# Patient Record
Sex: MALE | Race: BLACK OR AFRICAN AMERICAN | Employment: UNEMPLOYED | URBAN - METROPOLITAN AREA
[De-identification: names, ages, dates, MRNs, and addresses within clinical notes are randomized per-mention and may not be internally consistent; named-entity substitution may affect disease eponyms.]

---
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
## 2021-07-24 NOTE — ED PROVIDER NOTES
EMERGENCY DEPARTMENT HISTORY AND PHYSICAL EXAM      Date: 12/23/2018  Patient Name: Taco Lara    History of Presenting Illness     Chief Complaint   Patient presents with    Abscess     Patient states that he has a \"boil\" under his left arm        History Provided By: Patient and Patient's Mother    HPI: Taco Lara, 22 y.o. male with PMHx significant for obesity, presents ambulatory to the ED with cc of boil underneath of his L arm since Thursday. Pt states that he has had a boil once before that required I&D. Pt states that his pain is aching and sharp when the area is touched. No medication PTA. He has been without fever, chills, drainage, skin color changes to the area. PCP: None    There are no other complaints, changes, or physical findings at this time. Past History     Past Medical History:  Past Medical History:   Diagnosis Date    Ill-defined condition     seasonal allergies       Past Surgical History:  Past Surgical History:   Procedure Laterality Date    HX HEENT      tonsillectomy       Family History:  History reviewed. No pertinent family history. Social History:  Social History     Tobacco Use    Smoking status: Never Smoker   Substance Use Topics    Alcohol use: No    Drug use: Yes     Types: Marijuana       Allergies:  No Known Allergies      Review of Systems   Review of Systems   Constitutional: Negative. Negative for activity change, appetite change, chills and fever. Eyes: Negative for pain and visual disturbance. Respiratory: Negative for cough and shortness of breath. Cardiovascular: Negative for chest pain and palpitations. Gastrointestinal: Negative for nausea and vomiting. Musculoskeletal: Negative for arthralgias and myalgias. Skin: Negative for color change, rash and wound. + abscess   Neurological: Negative for dizziness and headaches. All other systems reviewed and are negative.       Physical Exam   Physical Exam   Constitutional: He is oriented to person, place, and time. He appears well-developed. No distress. 22 y.o.  male in NAD. Obese. Communicates appropriately and in full sentences   HENT:   Head: Normocephalic and atraumatic. Eyes: Conjunctivae are normal. Pupils are equal, round, and reactive to light. Neck: Normal range of motion. Neck supple. No nuchal rigidity or meningeal signs   Pulmonary/Chest: Effort normal. No respiratory distress. Musculoskeletal: Normal range of motion. He exhibits no deformity. No neurologic, motor, vascular, or compartment embarrassment observed on exam. No focal neurologic deficits. Neurological: He is alert and oriented to person, place, and time. Skin: Skin is warm and dry. He is not diaphoretic. No erythema. 4 cm boil to L axillae with central punctum. No cellulitic changes or drainage. Area has surrounding induration. Psychiatric: He has a normal mood and affect. His behavior is normal.   Nursing note and vitals reviewed. Diagnostic Study Results     No formal testing initiated. Medical Decision Making   I am the first provider for this patient. I reviewed the vital signs, available nursing notes, past medical history, past surgical history, family history and social history. Vital Signs-Reviewed the patient's vital signs. Patient Vitals for the past 12 hrs:   Temp Pulse Resp BP SpO2   12/23/18 1335 98.5 °F (36.9 °C) 77 16 158/80 99 %       Records Reviewed: Nursing Notes and Old Medical Records    Provider Notes (Medical Decision Making):   DDX: abscess, folliculitis, pilondial cyst, cellulitis, hiradenitis suppurativa, poor hygiene    Healthy Male presents complaining of  Abscess formation in his L axilla. No history of MRSA or obvious infection and pt is not immunocompromised. Pt has no fever or chills. In the ER is not toxic appearing. Will initiate I&D of the abscess and discharge patient with appropriate ABX.     ED Course:   Initial assessment performed. The patients presenting problems have been discussed, and they are in agreement with the care plan formulated and out. lined with them. I have encouraged them to ask questions as they arise throughout their visit. Procedure Note - Incision and Drainage:   3:08 PM  Performed by: Kyaw Fenton PA-C   Complexity: complex  Skin prepped with Chlorprep. Sterile field established. Anesthesia achieved with 5 mLs of Lidocaine 2% without epinephrine using a local infiltration. Abscess to axilla(e):left was incised with # 11 blade, and 8 mLs of thick, malodorous, purulent drainage was expressed. Wound probed and irrigated. Area was packed using 0.25 inch iodoform gauze. Sterile dressing applied. Estimated blood loss: 2-4 mL  The procedure took 16-30 minutes, and pt tolerated well. DISCHARGE NOTE:  Guerrero Cobian  results have been reviewed with him. He has been counseled regarding his diagnosis. He verbally conveys understanding and agreement of the signs, symptoms, diagnosis, treatment and prognosis and additionally agrees to follow up as recommended with Dr. None in 24 - 48 hours. He also agrees with the care-plan and conveys that all of his questions have been answered. I have also put together some discharge instructions for him that include: 1) educational information regarding their diagnosis, 2) how to care for their diagnosis at home, as well a 3) list of reasons why they would want to return to the ED prior to their follow-up appointment, should their condition change. He and/or family's questions have been answered. I have encouraged them to see the official results in [facility]\" or to retrieve the specifics of their results from medical records. PLAN:  1. Return precautions as discussed  2. Follow-up with providers as directed  3. Medications as prescribed    Return to ED if worse     Diagnosis     Clinical Impression:   1. Abscess of left axilla    2.  BMI 40.0-44.9, adult [facility])        Discharge Medication List as of 12/23/2018  3:30 PM      START taking these medications    Details   doxycycline (VIBRA-TABS) 100 mg tablet Take 1 Tab by mouth two (2) times a day for 7 days. , Normal, Disp-14 Tab, R-0             Follow-up Information     Follow up With Specialties Details Why Contact Info    None  Schedule an appointment as soon as possible for a visit in 2 days  None ([phone]) Patient stated that they have no PCP      [facility] EMERGENCY DEPT Emergency Medicine Go in 2 days For wound re-check [address] [place] [address]  [phone]              This note will not be viewable in [address] 3 Hour(s) 2 Minute(s)